# Patient Record
Sex: MALE | Race: WHITE | NOT HISPANIC OR LATINO | URBAN - NONMETROPOLITAN AREA
[De-identification: names, ages, dates, MRNs, and addresses within clinical notes are randomized per-mention and may not be internally consistent; named-entity substitution may affect disease eponyms.]

---

## 2019-02-04 ENCOUNTER — IMPORTED ENCOUNTER (OUTPATIENT)
Dept: URBAN - NONMETROPOLITAN AREA CLINIC 1 | Facility: CLINIC | Age: 29
End: 2019-02-04

## 2019-02-04 PROBLEM — H52.13: Noted: 2019-02-04

## 2019-02-04 PROCEDURE — 92004 COMPRE OPH EXAM NEW PT 1/>: CPT

## 2019-02-04 PROCEDURE — 92015 DETERMINE REFRACTIVE STATE: CPT

## 2019-02-04 NOTE — PATIENT DISCUSSION
Myopia-Discussed diagnosis with patient. -Explained that people who are myopic are at a higher risk for developing RD/RT and reviewed associated S&S.-Pt to contact our office if symptoms develop. Updated spec Rx given. Recommend lens that will provide comfort as well as protect safety and health of eyes.

## 2020-08-05 ENCOUNTER — IMPORTED ENCOUNTER (OUTPATIENT)
Dept: URBAN - NONMETROPOLITAN AREA CLINIC 1 | Facility: CLINIC | Age: 30
End: 2020-08-05

## 2020-08-05 PROCEDURE — 92014 COMPRE OPH EXAM EST PT 1/>: CPT

## 2020-08-05 PROCEDURE — 92015 DETERMINE REFRACTIVE STATE: CPT

## 2022-04-10 ASSESSMENT — VISUAL ACUITY
OD_SC: 20/20
OU_SC: 20/20
OS_SC: 20/20
OS_SC: 20/20
OD_SC: 20/20
OU_CC: J1+

## 2022-04-10 ASSESSMENT — TONOMETRY
OS_IOP_MMHG: 16
OD_IOP_MMHG: 16
OD_IOP_MMHG: 16
OS_IOP_MMHG: 16

## 2022-10-28 ENCOUNTER — ESTABLISHED PATIENT (OUTPATIENT)
Dept: RURAL CLINIC 1 | Facility: CLINIC | Age: 32
End: 2022-10-28

## 2022-10-28 DIAGNOSIS — H52.13: ICD-10-CM

## 2022-10-28 PROCEDURE — 92015 DETERMINE REFRACTIVE STATE: CPT

## 2022-10-28 PROCEDURE — 92310-E CONTACT LENS FITTING ESTABLISH PATIENT

## 2022-10-28 PROCEDURE — 92014 COMPRE OPH EXAM EST PT 1/>: CPT

## 2022-10-28 ASSESSMENT — VISUAL ACUITY
OS_CC: 20/20
OD_CC: 20/20
OD_CC: 20/20
OS_CC: 20/20

## 2022-10-28 ASSESSMENT — TONOMETRY
OD_IOP_MMHG: 16
OS_IOP_MMHG: 16

## 2022-10-28 NOTE — PATIENT DISCUSSION
Patient given Rx for glasses. Pt left with samples of daily CL. Will order more trials for pt to . Pt can call to finalize CL RX if good comfort and vision.

## 2023-10-30 ENCOUNTER — ESTABLISHED PATIENT (OUTPATIENT)
Dept: RURAL CLINIC 1 | Facility: CLINIC | Age: 33
End: 2023-10-30

## 2023-10-30 DIAGNOSIS — H52.13: ICD-10-CM

## 2023-10-30 DIAGNOSIS — H52.223: ICD-10-CM

## 2023-10-30 PROCEDURE — 92015 DETERMINE REFRACTIVE STATE: CPT

## 2023-10-30 PROCEDURE — 92014 COMPRE OPH EXAM EST PT 1/>: CPT

## 2023-10-30 ASSESSMENT — VISUAL ACUITY
OU_SC: 20/15
OD_SC: 20/20
OS_SC: 20/20

## 2023-10-30 ASSESSMENT — TONOMETRY
OD_IOP_MMHG: 18
OS_IOP_MMHG: 17

## 2024-10-31 ENCOUNTER — COMPREHENSIVE EXAM (OUTPATIENT)
Dept: RURAL CLINIC 1 | Facility: CLINIC | Age: 34
End: 2024-10-31

## 2024-10-31 DIAGNOSIS — H52.223: ICD-10-CM

## 2024-10-31 DIAGNOSIS — H52.13: ICD-10-CM

## 2024-10-31 PROCEDURE — 92015 DETERMINE REFRACTIVE STATE: CPT

## 2024-10-31 PROCEDURE — 92014 COMPRE OPH EXAM EST PT 1/>: CPT
